# Patient Record
Sex: MALE | URBAN - METROPOLITAN AREA
[De-identification: names, ages, dates, MRNs, and addresses within clinical notes are randomized per-mention and may not be internally consistent; named-entity substitution may affect disease eponyms.]

---

## 2019-01-11 ENCOUNTER — APPOINTMENT (RX ONLY)
Dept: URBAN - METROPOLITAN AREA CLINIC 57 | Facility: CLINIC | Age: 69
Setting detail: DERMATOLOGY
End: 2019-01-11

## 2019-01-11 DIAGNOSIS — R55 SYNCOPE AND COLLAPSE: ICD-10-CM

## 2019-01-11 DIAGNOSIS — R21 RASH AND OTHER NONSPECIFIC SKIN ERUPTION: ICD-10-CM

## 2019-01-11 PROBLEM — G40.909 EPILEPSY, UNSPECIFIED, NOT INTRACTABLE, WITHOUT STATUS EPILEPTICUS: Status: ACTIVE | Noted: 2019-01-11

## 2019-01-11 PROCEDURE — ? COUNSELING: PREDNISONE

## 2019-01-11 PROCEDURE — ? ORDER TESTS

## 2019-01-11 PROCEDURE — ? BIOPSY BY PUNCH METHOD

## 2019-01-11 PROCEDURE — ? SEPARATE AND IDENTIFIABLE DOCUMENTATION

## 2019-01-11 PROCEDURE — 11104 PUNCH BX SKIN SINGLE LESION: CPT

## 2019-01-11 PROCEDURE — ? TREATMENT REGIMEN

## 2019-01-11 PROCEDURE — 99203 OFFICE O/P NEW LOW 30 MIN: CPT | Mod: 25

## 2019-01-11 PROCEDURE — ? PRESCRIPTION

## 2019-01-11 PROCEDURE — ? COUNSELING

## 2019-01-11 PROCEDURE — 11105 PUNCH BX SKIN EA SEP/ADDL: CPT

## 2019-01-11 RX ORDER — TRIAMCINOLONE ACETONIDE 1 MG/G
CREAM TOPICAL
Qty: 1 | Refills: 0 | Status: ERX | COMMUNITY
Start: 2019-01-11

## 2019-01-11 RX ORDER — PREDNISONE 10 MG/1
TABLET ORAL
Qty: 50 | Refills: 0 | Status: ERX | COMMUNITY
Start: 2019-01-11

## 2019-01-11 RX ADMIN — TRIAMCINOLONE ACETONIDE: 1 CREAM TOPICAL at 00:00

## 2019-01-11 RX ADMIN — PREDNISONE: 10 TABLET ORAL at 00:00

## 2019-01-11 ASSESSMENT — LOCATION SIMPLE DESCRIPTION DERM
LOCATION SIMPLE: UPPER BACK
LOCATION SIMPLE: ABDOMEN

## 2019-01-11 ASSESSMENT — LOCATION ZONE DERM: LOCATION ZONE: TRUNK

## 2019-01-11 ASSESSMENT — LOCATION DETAILED DESCRIPTION DERM
LOCATION DETAILED: LEFT LATERAL ABDOMEN
LOCATION DETAILED: INFERIOR THORACIC SPINE

## 2019-01-11 NOTE — HPI: RASH
What Type Of Note Output Would You Prefer (Optional)?: Standard Output
How Severe Is Your Rash?: moderate
Is This A New Presentation, Or A Follow-Up?: Rash
Additional History: Pt has been to urgent care 4 times within the last month. Has been prescribed medications listed along with Bactrim DS and Hydroxyzine which patient had a bad reaction to. Pt reports blisters when taking those meds. Pt has since discontinued use.

## 2019-01-11 NOTE — PROCEDURE: TREATMENT REGIMEN
Discontinue Regimen: Doxycycline
Detail Level: Zone
Plan: Disc pt can continue taking Hydroxyzine, but instead of taking 3xday, rec only taking 1 at night or pt can take 1 benadryl at night instead. Pt to get labs done, lab slip and directions to [facility] given to pt today. Pt to RTC in 2 weeks for suture removal and f/u.
Initiate Treatment: Prednisone 10mg - take 4 po qd x 5 days, 3 po qd x 5 days, 2 po qd x t days, 1 po qd x 5 days \\nTAC cream - aaa body bid x 2 weeks on, 1 week off. Avoid face, underarms, groin. Repeat prn flares.
Plan: I strongly encouraged pt and his daughter to f/u with PCP about recent \"passing out\" episode in shower ASAP. \\nI instructed to go to ER ASAP if symptoms return.

## 2019-01-11 NOTE — PROCEDURE: BIOPSY BY PUNCH METHOD
Lab: [id number]
Was A Bandage Applied: Yes
Consent: Verbal consent was obtained and risks were reviewed including but not limited to scarring, infection, bleeding, scabbing, incomplete removal, nerve damage and allergy to anesthesia.
Biopsy Type: H and E
Hemostasis: Pressure
Notification Instructions: Patient will be notified of biopsy results. However, patient instructed to call the office if not contacted within 2 weeks.
Bill For Surgical Tray: no
Dressing: bandage
Lab Facility: [id number]
Number Of Epidermal Sutures (Optional): 1
Anesthesia Type: 1% lidocaine with epinephrine and a 1:10 solution of 8.4% sodium bicarbonate
Additional Anesthesia Volume In Cc (Will Not Render If 0): 0
Body Location Override (Optional - Billing Will Still Be Based On Selected Body Map Location If Applicable): left lateral abdomen (medial)
Suture Removal: 14 days
Billing Type: Third-Party Bill
Detail Level: Detailed
Post-Care Instructions: I reviewed with the patient in detail post-care instructions. Patient is to keep the biopsy site dry overnight, and then apply bacitracin twice daily until healed. Patient may apply hydrogen peroxide soaks to remove any crusting.
Punch Size In Mm: 4
Wound Care: Vaseline
Home Suture Removal Text: Patient was provided a home suture removal kit and will remove their sutures at home.  If they have any questions or difficulties they will call the office.
Epidermal Sutures: 4-0 Monosof
Home Suture Removal Text: Patient was provided a home suture removal kit and will remove their sutures at home.  If they have any questions or difficulties they will call the office.
Body Location Override (Optional - Billing Will Still Be Based On Selected Body Map Location If Applicable): left lateral abdomen (lateral)
Billing Type: Third-Party Bill
Lab Facility: [id number]
Lab: [id number]

## 2019-01-11 NOTE — HPI: OTHER
Condition:: Syncope
Please Describe Your Condition:: Pt reports recent episode of syncope. Patient lost consciousness and fell in shower. The episode happened days ago. Patient has not seen primary care physician concerning this episode yet.

## 2019-01-25 ENCOUNTER — APPOINTMENT (RX ONLY)
Dept: URBAN - METROPOLITAN AREA CLINIC 57 | Facility: CLINIC | Age: 69
Setting detail: DERMATOLOGY
End: 2019-01-25

## 2019-01-25 DIAGNOSIS — R55 SYNCOPE AND COLLAPSE: ICD-10-CM

## 2019-01-25 DIAGNOSIS — R79.89 OTHER SPECIFIED ABNORMAL FINDINGS OF BLOOD CHEMISTRY: ICD-10-CM

## 2019-01-25 DIAGNOSIS — R21 RASH AND OTHER NONSPECIFIC SKIN ERUPTION: ICD-10-CM | Status: RESOLVING

## 2019-01-25 PROCEDURE — 99213 OFFICE O/P EST LOW 20 MIN: CPT

## 2019-01-25 PROCEDURE — ? COUNSELING

## 2019-01-25 PROCEDURE — ? COUNSELING: PREDNISONE

## 2019-01-25 PROCEDURE — ? OTHER

## 2019-01-25 PROCEDURE — ? TREATMENT REGIMEN

## 2019-01-25 PROCEDURE — ? LAB REPORTS REVIEWED

## 2019-01-25 ASSESSMENT — SEVERITY ASSESSMENT: SEVERITY: MILD

## 2019-01-25 ASSESSMENT — LOCATION DETAILED DESCRIPTION DERM: LOCATION DETAILED: INFERIOR THORACIC SPINE

## 2019-01-25 ASSESSMENT — PAIN INTENSITY VAS: HOW INTENSE IS YOUR PAIN 0 BEING NO PAIN, 10 BEING THE MOST SEVERE PAIN POSSIBLE?: NO PAIN

## 2019-01-25 ASSESSMENT — LOCATION ZONE DERM: LOCATION ZONE: TRUNK

## 2019-01-25 ASSESSMENT — LOCATION SIMPLE DESCRIPTION DERM: LOCATION SIMPLE: UPPER BACK

## 2019-01-25 NOTE — PROCEDURE: TREATMENT REGIMEN
Continue Regimen: Prednisone 10mg - finish out taper (5 more tablets) \\nTAC cream - aaa body bid x 2 weeks on, 1 week off. Avoid face, underarms, groin. Repeat prn flares
Plan: Discussed pathology and lab results with patient and daughter. Discussed prompt RTC to clinic if rash rebounds after prednisone taper is finished - if this occurs consider biopsy for DIF as path report listed BP as ddx. Discussed daily antihistamines when he comes to the [place] since daughter stated that he had this rash in the past when he came to the [place] previously.
Detail Level: Zone

## 2019-01-25 NOTE — PROCEDURE: LAB REPORTS REVIEWED
Summarized Lab Results: Eosinophils were elevated. Discussed with patient and daughter that this goes along with pathology
Detail Level: Zone

## 2019-01-25 NOTE — PROCEDURE: OTHER
Other (Free Text): Pt and daughter again strongly encouraged to see PCP for evaluation
Note Text (......Xxx Chief Complaint.): This diagnosis correlates with the
Detail Level: Detailed